# Patient Record
Sex: MALE | URBAN - METROPOLITAN AREA
[De-identification: names, ages, dates, MRNs, and addresses within clinical notes are randomized per-mention and may not be internally consistent; named-entity substitution may affect disease eponyms.]

---

## 2022-11-26 ENCOUNTER — WEB ENCOUNTER (OUTPATIENT)
Dept: URBAN - METROPOLITAN AREA CLINIC 118 | Facility: CLINIC | Age: 62
End: 2022-11-26

## 2022-11-28 ENCOUNTER — OFFICE VISIT (OUTPATIENT)
Dept: URBAN - METROPOLITAN AREA CLINIC 118 | Facility: CLINIC | Age: 62
End: 2022-11-28

## 2023-01-11 ENCOUNTER — OFFICE VISIT (OUTPATIENT)
Dept: URBAN - METROPOLITAN AREA CLINIC 118 | Facility: CLINIC | Age: 63
End: 2023-01-11

## 2023-12-05 ENCOUNTER — OFFICE VISIT (OUTPATIENT)
Dept: URBAN - METROPOLITAN AREA CLINIC 17 | Facility: CLINIC | Age: 63
End: 2023-12-05

## 2023-12-05 RX ORDER — LISINOPRIL 40 MG/1
TABLET ORAL
Qty: 90 TABLET | Status: ACTIVE | COMMUNITY

## 2023-12-05 RX ORDER — ISOSORBIDE MONONITRATE 60 MG/1
TAKE 1 TABLET BY MOUTH TWICE A DAY TABLET, EXTENDED RELEASE ORAL
Qty: 90 EACH | Refills: 0 | Status: ACTIVE | COMMUNITY

## 2023-12-05 RX ORDER — ATORVASTATIN CALCIUM, FILM COATED 80 MG/1
TAKE 1 TABLET BY MOUTH EVERY DAY AT NIGHT TABLET ORAL
Qty: 90 EACH | Refills: 1 | Status: ACTIVE | COMMUNITY

## 2023-12-05 RX ORDER — ASPIRIN 81 MG/1
TAKE 1 TABLET BY MOUTH EVERY DAY TABLET, COATED ORAL
Qty: 90 EACH | Refills: 3 | Status: ACTIVE | COMMUNITY

## 2023-12-05 RX ORDER — CARVEDILOL 25 MG/1
TAKE 1 TABLET BY MOUTH TWICE A DAY TABLET, FILM COATED ORAL
Qty: 180 EACH | Refills: 0 | Status: ACTIVE | COMMUNITY

## 2023-12-05 RX ORDER — METFORMIN HCL 1000 MG/1
TAKE 1 TABLET BY MOUTH TWICE A DAY WITH MEALS TABLET ORAL
Qty: 180 EACH | Refills: 0 | Status: ACTIVE | COMMUNITY

## 2024-02-27 ENCOUNTER — OV CON (OUTPATIENT)
Dept: URBAN - METROPOLITAN AREA CLINIC 17 | Facility: CLINIC | Age: 64
End: 2024-02-27
Payer: COMMERCIAL

## 2024-02-27 VITALS
HEART RATE: 76 BPM | TEMPERATURE: 97 F | WEIGHT: 250 LBS | SYSTOLIC BLOOD PRESSURE: 132 MMHG | HEIGHT: 70 IN | BODY MASS INDEX: 35.79 KG/M2 | DIASTOLIC BLOOD PRESSURE: 79 MMHG

## 2024-02-27 DIAGNOSIS — E11.9 TYPE 2 DIABETES MELLITUS WITHOUT COMPLICATION, WITHOUT LONG-TERM CURRENT USE OF INSULIN: ICD-10-CM

## 2024-02-27 DIAGNOSIS — Z12.11 SCREENING FOR COLON CANCER: ICD-10-CM

## 2024-02-27 DIAGNOSIS — E78.5 HYPERLIPIDEMIA: ICD-10-CM

## 2024-02-27 DIAGNOSIS — I10 ESSENTIAL HYPERTENSION: ICD-10-CM

## 2024-02-27 PROBLEM — 399211009: Status: ACTIVE | Noted: 2024-02-27

## 2024-02-27 PROBLEM — 313436004: Status: ACTIVE | Noted: 2024-02-27

## 2024-02-27 PROBLEM — 162864005: Status: ACTIVE | Noted: 2024-02-27

## 2024-02-27 PROBLEM — 59621000: Status: ACTIVE | Noted: 2024-02-27

## 2024-02-27 PROBLEM — 55822004: Status: ACTIVE | Noted: 2024-02-27

## 2024-02-27 PROBLEM — 722596001: Status: ACTIVE | Noted: 2024-02-27

## 2024-02-27 PROCEDURE — 99243 OFF/OP CNSLTJ NEW/EST LOW 30: CPT | Performed by: INTERNAL MEDICINE

## 2024-02-27 PROCEDURE — 99203 OFFICE O/P NEW LOW 30 MIN: CPT | Performed by: INTERNAL MEDICINE

## 2024-02-27 RX ORDER — ASPIRIN 81 MG/1
TAKE 1 TABLET BY MOUTH EVERY DAY TABLET, COATED ORAL
Qty: 90 EACH | Refills: 3 | Status: ACTIVE | COMMUNITY

## 2024-02-27 RX ORDER — CARVEDILOL 25 MG/1
TAKE 1 TABLET BY MOUTH TWICE A DAY TABLET, FILM COATED ORAL
Qty: 180 EACH | Refills: 0 | Status: ACTIVE | COMMUNITY

## 2024-02-27 RX ORDER — AMLODIPINE BESYLATE 10 MG/1
1 TABLET TABLET ORAL ONCE A DAY
Status: ACTIVE | COMMUNITY

## 2024-02-27 RX ORDER — ATORVASTATIN CALCIUM, FILM COATED 80 MG/1
TAKE 1 TABLET BY MOUTH EVERY DAY AT NIGHT TABLET ORAL
Qty: 90 EACH | Refills: 1 | Status: ACTIVE | COMMUNITY

## 2024-02-27 RX ORDER — LISINOPRIL 40 MG/1
TABLET ORAL
Qty: 90 TABLET | Status: ACTIVE | COMMUNITY

## 2024-02-27 RX ORDER — ISOSORBIDE MONONITRATE 60 MG/1
TAKE 1 TABLET BY MOUTH TWICE A DAY TABLET, EXTENDED RELEASE ORAL
Qty: 90 EACH | Refills: 0 | Status: ACTIVE | COMMUNITY

## 2024-02-27 RX ORDER — METFORMIN HCL 1000 MG/1
TAKE 1 TABLET BY MOUTH TWICE A DAY WITH MEALS TABLET ORAL
Qty: 180 EACH | Refills: 0 | Status: ACTIVE | COMMUNITY

## 2024-02-27 NOTE — HPI-TODAY'S VISIT:
New patient 64 yo male with PMH of HTN, HLD and DM2 referred by Dr. Portillo Carmona. The patient presents for a colon cancer screening. There is no family history of colon polyps or colon cancer. Patient denies change in bowel habits, appetite, and weight. Patient denies rectal bleeding. Patient denies any lung or kidney problems. Patient denies any digestive symptoms currently. Last colonoscopy: over 20 years ago, atient unsure where but he states it was normal.   Patient takes ASA 81 mg daily. He had MI in 2020 with 2 stent placements. He is no longer followed by cardiology.  He states he plans on seeing cardiology as it was recommended by his DIT provider.

## 2024-05-01 ENCOUNTER — LAB OUTSIDE AN ENCOUNTER (OUTPATIENT)
Dept: URBAN - METROPOLITAN AREA CLINIC 17 | Facility: CLINIC | Age: 64
End: 2024-05-01

## 2024-05-30 ENCOUNTER — OFFICE VISIT (OUTPATIENT)
Dept: URBAN - METROPOLITAN AREA SURGERY CENTER 16 | Facility: SURGERY CENTER | Age: 64
End: 2024-05-30

## 2024-05-30 RX ORDER — CARVEDILOL 25 MG/1
TAKE 1 TABLET BY MOUTH TWICE A DAY TABLET, FILM COATED ORAL
Qty: 180 EACH | Refills: 0 | Status: ACTIVE | COMMUNITY

## 2024-05-30 RX ORDER — METFORMIN HCL 1000 MG/1
TAKE 1 TABLET BY MOUTH TWICE A DAY WITH MEALS TABLET ORAL
Qty: 180 EACH | Refills: 0 | Status: ACTIVE | COMMUNITY

## 2024-05-30 RX ORDER — ISOSORBIDE MONONITRATE 60 MG/1
TAKE 1 TABLET BY MOUTH TWICE A DAY TABLET, EXTENDED RELEASE ORAL
Qty: 90 EACH | Refills: 0 | Status: ACTIVE | COMMUNITY

## 2024-05-30 RX ORDER — ATORVASTATIN CALCIUM, FILM COATED 80 MG/1
TAKE 1 TABLET BY MOUTH EVERY DAY AT NIGHT TABLET ORAL
Qty: 90 EACH | Refills: 1 | Status: ACTIVE | COMMUNITY

## 2024-05-30 RX ORDER — ASPIRIN 81 MG/1
TAKE 1 TABLET BY MOUTH EVERY DAY TABLET, COATED ORAL
Qty: 90 EACH | Refills: 3 | Status: ACTIVE | COMMUNITY

## 2024-05-30 RX ORDER — LISINOPRIL 40 MG/1
TABLET ORAL
Qty: 90 TABLET | Status: ACTIVE | COMMUNITY

## 2024-05-30 RX ORDER — AMLODIPINE BESYLATE 10 MG/1
1 TABLET TABLET ORAL ONCE A DAY
Status: ACTIVE | COMMUNITY